# Patient Record
Sex: FEMALE | Race: WHITE
[De-identification: names, ages, dates, MRNs, and addresses within clinical notes are randomized per-mention and may not be internally consistent; named-entity substitution may affect disease eponyms.]

---

## 2017-07-28 ENCOUNTER — HOSPITAL ENCOUNTER (EMERGENCY)
Dept: HOSPITAL 10 - E/R | Age: 52
Discharge: HOME | End: 2017-07-28
Payer: SELF-PAY

## 2017-07-28 VITALS — HEART RATE: 90 BPM | SYSTOLIC BLOOD PRESSURE: 137 MMHG | DIASTOLIC BLOOD PRESSURE: 83 MMHG | RESPIRATION RATE: 18 BRPM

## 2017-07-28 VITALS — HEIGHT: 55 IN | WEIGHT: 165.35 LBS | BODY MASS INDEX: 38.27 KG/M2

## 2017-07-28 DIAGNOSIS — I10: Primary | ICD-10-CM

## 2017-07-28 PROCEDURE — 96374 THER/PROPH/DIAG INJ IV PUSH: CPT

## 2017-07-28 PROCEDURE — 70450 CT HEAD/BRAIN W/O DYE: CPT

## 2017-07-28 NOTE — ERD
ER Documentation


Chief Complaint


Date/Time


DATE: 17 


TIME: 13:53


Chief Complaint


SENT BY MD FOR HTN





HPI


This is a 51-year-old female who is sent by her primary care physician for 

elevated blood pressure.  Patient states she has had a headache for 2 days in 

the occipital region is constant dull but denies any neurological complaints 

such as visual change speech change numbness or weakness in extremities.  She 

says she gets these types of headaches when she has elevated blood pressure.  

She states she is not taking her blood pressure medication anymore.  She went 

to her doctor's office and was sent here because of elevated blood pressure.  

The patient has no chest pain no shortness of breath no dizziness no blurry 

vision.





ROS


All systems reviewed and are negative except as per history of present illness.





Medications


Home Meds


Active Scripts


Olmesartan Medoxomil (Benicar) 20 Mg Tablet, 20 MG PO QAM, #30 TAB


   Prov:QUENTIN ARAUZ DO         17


Amlodipine Besylate* (Norvasc*) 10 Mg Tablet, 10 MG PO QHS, #30 TAB


   Prov:QUENTIN ARAUZ DO         17


Reported Medications


Hydrochlorothiazide* (Hydrochlorothiazide*) 25 Mg Tab, 25 MG PO DAILY, #30 TAB


   PT HASNT FILLED SINCE 20


Discontinued Scripts


Ciprofloxacin Hcl* (Ciprofloxacin Hcl*) 500 Mg Tablet, 500 MG PO BID for 3 Days

, TAB


   Prov:DUSTIN LEDESMA DO         16


Ondansetron (Ondansetron Odt) 4 Mg Tab.rapdis, 4 MG PO Q6H Y for NAUSEA AND/OR 

VOMITING, #6 TAB


   Prov:DUSTIN LEDESMA DO         16


Ibuprofen* (Motrin*) 600 Mg Tab, 600 MG PO Q6H Y for PAIN AND OR ELEVATED TEMP, 

#10 TAB


   Prov:DUSTIN LEDESMA 16


Phenazopyridine Hcl* (Pyridium*) 100 Mg Tab, 100 MG PO TID Y for URINARY PAIN, #

8 TAB


   Prov:DUSTIN LEDESMA DO         16


Hydrocodone/Acetaminophen (Norco  Tablet) 1 Each Tablet, 1 TAB PO Q6H Y 

for PAIN, #7 TAB


   Prov:DUSTIN LEDESMA 16


Pantoprazole* (Protonix*) 40 Mg Tablet.dr, 40 MG PO DAILY, #20 TAB


   Prov:BOOM MURPHYMaurice         16


Sucralfate* (Carafate*) 1 Gm Tab, 1 GM PO QID, #30 TAB


   Prov:BOOM MURPHYMaurice         16


Hydrochlorothiazide* (Hydrochlorothiazide*) 25 Mg Tab, 25 MG PO DAILY, #30 TAB


   Prov:HAWA WOOD MD         7/14/15





Allergies


Allergies:  


Coded Allergies:  


     No Known Allergy (Unverified , 17)





PMhx/Soc


History of Surgery:  Yes ( X4)


Anesthesia Reaction:  No


Hx Neurological Disorder:  No


Hx Respiratory Disorders:  No


Hx Cardiac Disorders:  Yes (htn)


Hx Psychiatric Problems:  No


Hx Miscellaneous Medical Probl:  No


Hx Alcohol Use:  No


Hx Substance Use:  No


Hx Tobacco Use:  No


Smoking Status:  Never smoker





FmHx


Family History:  No coronary disease





Physical Exam


Vitals





Vital Signs








  Date Time  Temp Pulse Resp B/P Pulse Ox O2 Delivery O2 Flow Rate FiO2


 


17 11:41 98.0 69 18 237/112 99   








Physical Exam


Const:      Well-developed, well-nourished


 Head:        Atraumatic, normocephalic


 Eyes:       Normal Conjunctiva, PERRLA, EOMI, normal sclera, no nystagmus


 ENT:         Normal External Ears, Nose and Mouth, moist mucus membranes.


 Neck:        Full range of motion.  No meningismus, no lymphadenopathy.


 Resp:         Clear to auscultation bilaterally, no wheezing, rhonchi, rales


 Cardio:       Regular rate and rhythm, no murmurs, S1 S2 present


 Abd:         Soft, non tender x 4, non distended. Normal bowel sounds, no 

guarding or rebound, no pulsitile abdominal masses or bruits


 Skin:         No petechiae or rashes, no ecchymosis , no maculopapular rash


 Back:        No midline or flank tenderness


 Ext:          No cyanosis, or edema, FROM x 4, normal inspection, 

neurovascularly intact x 4


 Neur:        Awake and alert, STR 5/5 x 4, sensation intact x 4, no focal 

findings, cerebellum intact


 Psych:        Normal Mood and Affect


Results 24 hrs





 Current Medications








 Medications


  (Trade)  Dose


 Ordered  Sig/Toan


 Route


 PRN Reason  Start Time


 Stop Time Status Last Admin


Dose Admin


 


 Nicardipine HCl


  (Cardene)  30 mg  ONCE  ONCE


 PO


   17 12:00


 17 12:01 DC 17 12:28


 


 


 Hydralazine HCl


  (Apresoline)  10 mg  ONCE  ONCE


 IV


   17 12:00


 17 12:01 DC 17 12:28


 


 


 Nicardipine HCl


  (Cardene)  30 mg  ONCE  ONCE


 PO


   17 14:00


 17 14:01   


 











Procedures/MDM





PROCEDURE:   CT Brain without. 


 


CLINICAL INDICATION:  Headaches.


 


TECHNIQUE:   A CT of the brain was performed on multidetector high-resolution 

CT scanner utilizing axial sections from the skull base through the vertex 

without contrast.  The scan was reviewed in soft tissue brain and high 

frequency resolution bone algorithm windows.  Images were reviewed on a high-

resolution PACS workstation. One or more the following does reduction 

techniques were utilized: Automated exposure control, adjustment of the mA/ or 

kV according to patient's size, or use of iterative reconstruction technique. 

The exam CTDI = 45.01 mGy and the DLP = 630.2 mGy-cm. 


 


COMPARISON:   Brain CT 1950.


 


FINDINGS:


 


The ventricles and sulci are age-appropriate. There is no intracranial 

hemorrhage, mass effect or midline shift.  No abnormal intra-axial or extra-

axial fluid collections are seen. The gray/white matter differentiation is 

preserved. 


Small 3 mm focal calcification is noted in the left parietal lobe which likely 

represents sequela of prior infection such as neurocysticercosis.


 


There are minimal scattered foci of hypoattenuation in the white matter, which 

are nonspecific in etiology but likely reflect chronic small vessel ischemic 

changes. The visualized paranasal sinuses are essentially clear. 


 


IMPRESSION:


 


1.  No acute intracranial hemorrhage, transcortical infarction or mass effect.


 


2.  Small left parietal lobe calcification which likely represents sequela of 

prior infection such as neurocysticercosis.


 


3.  Minimal generalized cerebral volume loss.


 


 


RPTAT: HH


_____________________________________________ 


.Kirti Grande MD, MD           Date    Time 


Electronically viewed and signed by .Kirti Grande MD, MD on 2017 13:

05 


 


D:  2017 13:05  T:  2017 13:05


.N/





CC: QUENTIN ARAUZ DO














Patient's blood pressure is now 137/84 after treatment.





Will send the patient home with Norvasc and Benicar for blood pressure control.





Departure


Diagnosis:  


 Primary Impression:  


 Uncontrolled hypertension


Condition:  Stable


Patient Instructions:  High Blood Pressure (Hypertension)











QUENTIN ARAUZ DO 2017 13:58

## 2019-07-13 ENCOUNTER — HOSPITAL ENCOUNTER (EMERGENCY)
Dept: HOSPITAL 91 - FTE | Age: 54
Discharge: HOME | End: 2019-07-13
Payer: SELF-PAY
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
## 2019-07-13 DIAGNOSIS — I10: ICD-10-CM
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
## 2019-07-13 DIAGNOSIS — Z23: ICD-10-CM

## 2019-07-13 DIAGNOSIS — S61.210A: Primary | ICD-10-CM

## 2019-07-13 PROCEDURE — 90471 IMMUNIZATION ADMIN: CPT

## 2019-07-13 PROCEDURE — 12002 RPR S/N/AX/GEN/TRNK2.6-7.5CM: CPT

## 2019-07-13 PROCEDURE — 99283 EMERGENCY DEPT VISIT LOW MDM: CPT

## 2019-07-13 PROCEDURE — 90715 TDAP VACCINE 7 YRS/> IM: CPT

## 2019-07-13 RX ADMIN — LIDOCAINE HYDROCHLORIDE 1 ML: 10 INJECTION, SOLUTION INFILTRATION; PERINEURAL at 07:17

## 2019-07-13 RX ADMIN — CLOSTRIDIUM TETANI TOXOID ANTIGEN (FORMALDEHYDE INACTIVATED), CORYNEBACTERIUM DIPHTHERIAE TOXOID ANTIGEN (FORMALDEHYDE INACTIVATED), BORDETELLA PERTUSSIS TOXOID ANTIGEN (GLUTARALDEHYDE INACTIVATED), BORDETELLA PERTUSSIS FILAMENTOUS HEMAGGLUTININ ANTIGEN (FORMALDEHYDE INACTIVATED), BORDETELLA PERTUSSIS PERTACTIN ANTIGEN, AND BORDETELLA PERTUSSIS FIMBRIAE 2/3 ANTIGEN 1 ML: 5; 2; 2.5; 5; 3; 5 INJECTION, SUSPENSION INTRAMUSCULAR at 07:17

## 2019-07-13 RX ADMIN — BACITRACIN ZINC AND POLYMYXIN B SULFATE 1 APPLIC: 500; 10000 OINTMENT TOPICAL at 07:17

## 2019-07-13 RX ADMIN — IBUPROFEN 1 MG: 800 TABLET, FILM COATED ORAL at 07:16

## 2019-07-19 ENCOUNTER — HOSPITAL ENCOUNTER (EMERGENCY)
Dept: HOSPITAL 91 - E/R | Age: 54
Discharge: HOME | End: 2019-07-19
Payer: SELF-PAY

## 2019-07-19 ENCOUNTER — HOSPITAL ENCOUNTER (EMERGENCY)
Dept: HOSPITAL 10 - E/R | Age: 54
Discharge: HOME | End: 2019-07-19
Payer: SELF-PAY

## 2019-07-19 VITALS — SYSTOLIC BLOOD PRESSURE: 122 MMHG | RESPIRATION RATE: 18 BRPM | HEART RATE: 76 BPM | DIASTOLIC BLOOD PRESSURE: 78 MMHG

## 2019-07-19 VITALS — WEIGHT: 160.94 LBS | HEIGHT: 55 IN | BODY MASS INDEX: 37.24 KG/M2

## 2019-07-19 DIAGNOSIS — I10: ICD-10-CM

## 2019-07-19 DIAGNOSIS — Z48.00: Primary | ICD-10-CM

## 2019-07-19 PROCEDURE — 99281 EMR DPT VST MAYX REQ PHY/QHP: CPT

## 2019-07-19 NOTE — ERD
ER Documentation


Chief Complaint


Chief Complaint





SUTURE REMOVAL





HPI


53-year-old female, right-handed, presents the emergency department for suture 


removal.  Patient sustained a laceration of the right thumb that was repaired 


with stitches here in the emergency department.  The patient refers feeling 


better, no fever, no chills, no pain, no distal numbness or tingling.





ROS


All systems reviewed and are negative except as per history of present illness.





Medications


Home Meds


Active Scripts


Ibuprofen* (Motrin*) 800 Mg Tab, 800 MG PO Q6H PRN for PAIN AND OR ELEVATED 


TEMP, #30 TAB


   Prov:CHACORTA NOLASCO PA-C         19


Olmesartan Medoxomil (Benicar) 20 Mg Tablet, 20 MG PO QAM, #30 TAB


   Prov:QUENTIN ARAUZ DO         17


Amlodipine Besylate* (Norvasc*) 10 Mg Tablet, 10 MG PO QHS, #30 TAB


   Prov:QUENTIN ARAUZ DO         17


Reported Medications


Hydrochlorothiazide* (Hydrochlorothiazide*) 25 Mg Tab, 25 MG PO DAILY, #30 TAB


   PT HASNT FILLED SINCE 20





Allergies


Allergies:  


Coded Allergies:  


     No Known Allergy (Unverified , 17)





PMhx/Soc


History of Surgery:  Yes ( X4)


Anesthesia Reaction:  No


Hx Neurological Disorder:  No


Hx Respiratory Disorders:  No


Hx Cardiac Disorders:  Yes (htn)


Hx Psychiatric Problems:  No


Hx Miscellaneous Medical Probl:  No


Hx Alcohol Use:  No


Hx Substance Use:  No


Hx Tobacco Use:  No





FmHx


Family History:  diabetes; 


   No coronary disease





Physical Exam


Vitals





Vital Signs


  Date      Temp  Pulse  Resp  B/P (MAP)   Pulse Ox  O2          O2 Flow    FiO2


Time                                                 Delivery    Rate


   19  98.0     76    18      122/78        99


     18:06                           (93)





Physical Exam


Const:   No acute distress


Head:   Atraumatic 


Eyes:    Normal Conjunctiva


ENT:    Normal External Ears, Nose and Mouth.


Neck:               Full range of motion. No meningismus.


Resp:   Clear to auscultation bilaterally


Cardio:   Regular rate and rhythm, no murmurs


Abd:    Soft, non tender, non distended. Normal bowel sounds


Skin:   No petechiae or rashes


Back:   No midline or flank tenderness


Ext:    Right thumb: Stitches in place, incision clean, dry and intact.  No cyan


osis, or edema


Neur:   Awake and alert


Psych:    Normal Mood and Affect





Procedures/MDM


Status post laceration repair 6 days ago.  Adequate pain control, no fever, no 


chills, good compliance with medications no side effects.


The patient was evaluated for infection and neurovascular compromise.


The wound was clean and irrigated with normal saline and stitches removed 


without difficulty.





Patient is stable, with adequate healing process, okay to discharge home, 


medication adherence reinforced.  some side effects of prescribed medications 


(headache, rash, nausea, vomiting, diarrhea, drowsiness, habituation, bleeding, 


hypertension, interactions with other medications) were reviewed.





The patient was instructed to follow up with the primary care provider in the 


next 48h.  If symptoms persist, worsen or new symptoms develop, then patient 


should return to the ED immediately.





Instructions explained and given directly by me to the patient with 


acknowledgment and demonstrated understanding.





Disclaimer: Inadvertent spelling and grammatical errors are likely due to 


EHR/dictation software use and do not reflect on the overall quality of patient 


care. Also, please note that the electronic time recorded on this note does not 


necessarily reflect the actual time of the patient encounter.





Departure


Diagnosis:  


   Primary Impression:  


   Encounter for removal of sutures


Condition:  Stable





Additional Instructions:  


Muchas brittney por [facility] para morelos servicio.





Esperamos que en morelos visita a la sangeetha de emergencia morelos problema medico haya sido 


solucionado y que se sienta mucho mejor. 





Para estar seguros que morelos mejoria sigue en proceso, le pedimos el favor de hacer


randy louise de seguimiento medico con morelos doctor primario en los proximos 2-4 lorenzo.





Lleve con usted estos documentos y las medicinas recetadas.





Si nurys sintomas empeoran, NO SE ESPERE, por favor regrese a sangeetha de emergencia 


INMEDIATAMENTE.





En flavia que usted no tenga un mdico de atencin primaria:


Llame al mdico o clnica comunitaria de referencia que aparece abajo susan 


las horas de consultorio para hacer randy louise para que le vean.





CLINICAS:


[facility]  [phone] [address] [facility]  [phone] [address] [facility] [phone] [address] [facility]  [phone]


[address] [facility]   [phone] [address] [facility]. [phone] 


[address] BARTOLO DANIEL MD      2019 18:16

## 2023-03-27 NOTE — RADRPT
PROCEDURE:   CT Brain without. 

 

CLINICAL INDICATION:  Headaches.

 

TECHNIQUE:   A CT of the brain was performed on multidetector high-resolution CT scanner utilizing a
xial sections from the skull base through the vertex without contrast.  The scan was reviewed in sof
t tissue brain and high frequency resolution bone algorithm windows.  Images were reviewed on a high
-resolution PACS workstation. One or more the following does reduction techniques were utilized: Aut
omated exposure control, adjustment of the mA/ or kV according to patient's size, or use of iterativ
e reconstruction technique. The exam CTDI = 45.01 mGy and the DLP = 630.2 mGy-cm. 

 

COMPARISON:   Brain CT 07/14/1950.

 

FINDINGS:

 

The ventricles and sulci are age-appropriate. There is no intracranial hemorrhage, mass effect or mi
dline shift.  No abnormal intra-axial or extra-axial fluid collections are seen. The rojas/white andrea
er differentiation is preserved. 

Small 3 mm focal calcification is noted in the left parietal lobe which likely represents sequela of
 prior infection such as neurocysticercosis.

 

There are minimal scattered foci of hypoattenuation in the white matter, which are nonspecific in et
iology but likely reflect chronic small vessel ischemic changes. The visualized paranasal sinuses ar
e essentially clear. 

 

IMPRESSION:

 

1.  No acute intracranial hemorrhage, transcortical infarction or mass effect.

 

2.  Small left parietal lobe calcification which likely represents sequela of prior infection such a
s neurocysticercosis.

 

3.  Minimal generalized cerebral volume loss.

 

 

RPTAT: HH

_____________________________________________ 

.Kirti Grande MD, MD           Date    Time 

Electronically viewed and signed by .Kirti Grande MD, MD on 07/28/2017 13:05 

 

D:  07/28/2017 13:05  T:  07/28/2017 13:05

.N/ Goal: Patient will increase in strength by 1/2 grade to improve in ADLs and ambulation in 3 weeks.